# Patient Record
Sex: MALE | Race: OTHER | Employment: FULL TIME | URBAN - METROPOLITAN AREA
[De-identification: names, ages, dates, MRNs, and addresses within clinical notes are randomized per-mention and may not be internally consistent; named-entity substitution may affect disease eponyms.]

---

## 2021-01-22 ENCOUNTER — HOSPITAL ENCOUNTER (OUTPATIENT)
Dept: WOUND CARE | Age: 46
Discharge: HOME | End: 2021-01-22
Attending: SURGERY

## 2021-01-22 VITALS
HEIGHT: 68 IN | HEART RATE: 62 BPM | SYSTOLIC BLOOD PRESSURE: 129 MMHG | DIASTOLIC BLOOD PRESSURE: 75 MMHG | WEIGHT: 147 LBS | TEMPERATURE: 97.6 F | BODY MASS INDEX: 22.28 KG/M2

## 2021-01-22 PROCEDURE — 99213 OFFICE O/P EST LOW 20 MIN: CPT

## 2021-01-22 RX ORDER — ELECTROLYTES/DEXTROSE
SOLUTION, ORAL ORAL ONCE
Status: DISPENSED | OUTPATIENT
Start: 2021-01-22 | End: 2021-01-22

## 2021-01-22 RX ORDER — TRIAMCINOLONE ACETONIDE 5 MG/G
CREAM TOPICAL 2 TIMES DAILY
Qty: 15 G | Refills: 0 | Status: SHIPPED | OUTPATIENT
Start: 2021-01-22

## 2021-01-22 NOTE — DISCHARGE INSTRUCTIONS
Right lower leg:  Wash leg gently with soap and water. Apply steroid cream to rash. Cover with xeroform gauze and cover with dry gauze. Wear tubigrip sock each day.   Change dressing every day.  Leanne Dickerson Dr [facility]  [phone]        Friday, January 29th at 8:00am

## 2021-01-22 NOTE — WOUND CARE
[address] [place] [address], [address]  Phone: [phone] Fax: [phone] Patient: Daniel Gorman MRN: [id number]  SSN: [id number] YOB: 1975  Age: 39 y.o. Sex: male Return Appointment: 1 week with Ela Penny MD 
 
Instructions: 
 
 Right lower leg: 
Wash leg gently with soap and water. Apply steroid cream to rash. Cover with xeroform gauze and cover with dry gauze. Wear tubigrip sock each day. Change dressing every day. [address] [facility], [address] [facility] 
[phone] Friday, January 29th at 8:00am 
 
 
Should you experience increased redness, swelling, pain, foul odor, size of wound(s), or have a temperature over 101 degrees please contact the [address] [place] [address] Road at [phone] or if after hours contact your primary care physician or go to the hospital emergency department. Signed By: Rebecca Streeter RN   
 January 22, 2021

## 2021-01-22 NOTE — WOUND CARE
[facility] de curación de heridas [address], [address]  Teléfono: [phone] Fax: [phone] Paciente: Cherelle Grullon MRN: [id number] SSN: [id number] Fecha de nacimiento: 1975 Edad: 45 años Sexo masculino Caitlin de regreso: 1 semana con Hansa Rosario MD 
 
 
 Instrucciones: Jewel [facility]: Lave la pierna suavemente con agua y [place]. Aplique crema con esteroides sobre la erupción. Cubrir con gasa xeroform y [facility] con gasa seca. Use calcetines tubigrip todos los terrell. Cambie el vendaje todos los días. [facility] de atención primaria de [address] [facility] [address] [facility], [address] [facility]  
[phone] Viernes 29 de enero a las 8:00 am  
 
 
Si experimenta un aumento de enrojecimiento, hinchazón, dolor, mal olor, tamaño de la (s) herida (s) o tiene goldie temperatura superior a 101 grados, comuníquese con [facility] de [facility] al [phone] o si fuera del horario de [facility], comuníquese con todd médico de atención primaria o vaya al servicio de urgencias del hospital.  
 
Damaso Stands por: Donny Duque RN 22 de enero de 2021

## 2021-01-22 NOTE — WOUND CARE
01/22/21 2063 Wound Ankle Right;Medial #1 Date First Assessed/Time First Assessed: 01/22/21 0833   Present on Hospital Admission: Yes  Primary Wound Type: Rash  Location: Ankle  Wound Location Orientation: Right;Medial  Wound Description: #1 Wound Image Wound Etiology Other (Comment) 
(rash) Dressing/Treatment Open to air Wound Length (cm) 9 cm Wound Width (cm) 4.5 cm Wound Depth (cm) 0.1 cm Wound Surface Area (cm^2) 40.5 cm^2 Wound Volume (cm^3) 4.05 cm^3 Wound Assessment  
(rash) Drainage Amount Scant Drainage Description Serous Wound Odor None Wound Leg lower Right;Medial #2 Date First Assessed/Time First Assessed: 01/22/21 0834   Present on Hospital Admission: Yes  Primary Wound Type: Rash  Location: Leg lower  Wound Location Orientation: Right;Medial  Wound Description: #2 Wound Image Wound Etiology Other (Comment) 
(rash) Cleansed Cleansed with saline Dressing/Treatment Open to air Wound Length (cm) 4 cm Wound Width (cm) 2.5 cm Wound Depth (cm) 0.1 cm Wound Surface Area (cm^2) 10 cm^2 Wound Volume (cm^3) 1 cm^3 Wound Assessment  
(rash) Drainage Amount Scant Drainage Description Serous Wound Odor None

## 2021-02-05 NOTE — PROGRESS NOTES
Wound Center Progress Note    Patient: Fitz Awad MRN: [id number]  SSN: [id number]    YOB: 1975  Age: 39 y.o. 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 No pertinent surgical history. History reviewed. No pertinent family history. Social History     Tobacco Use    Smoking status: Current Every Day Smoker     Packs/day: 1.00    Smokeless tobacco: Never Used   Substance Use Topics    Alcohol use: No     Comment: quit 2 mos       Prior to Admission medications    Medication Sig Start Date End Date Taking? Authorizing Provider   triamcinolone (ARISTOCORT) 0.5 % topical cream Apply  to affected area two (2) times a day. use thin layer 1/22/21  Yes Lester Ramirez MD     Allergies   Allergen Reactions    Pcn [Penicillins] Hives        Review of Systems:  CONSTITUTIONAL: No fever, chills  HEAD: No headache  EYES: No visual loss  ENT: No hearing loss  SKIN: + rash  CARDIOVASCULAR: No chest pain  RESPIRATORY: No shortness of breath  GASTROINTESTINAL: No nausea, vomiting  GENITOURINARY: No excessive urination  NEUROLOGICAL: No weakness  MUSCULOSKELETAL: No muscle pain. No neck pain  HEMATOLOGIC: No easy bleeding  LYMPHATICS: No lymphedema. PSYCHIATRIC: No current depression  ENDOCRINOLOGIC: No high sugars  ALLERGIES: No history of asthma, hives, eczema or rhinitis. No results found for: HBA1C, CDU7SNGJ, HGBE8, ALU4QWTB, LUZ7LQEA     Immunization History   Administered Date(s) Administered    TD Vaccine 10/01/2012       Body mass index is 22.35 kg/m².       Current medications:  Current Outpatient Medications Medication Sig Dispense Refill    triamcinolone (ARISTOCORT) 0.5 % topical cream Apply  to affected area two (2) times a day. use thin layer 15 g 0         Objective:     Physical Exam:     Visit Vitals  /75 (BP 1 Location: Left arm, BP Patient Position: At rest)   Pulse 62   Temp 97.6 °F (36.4 °C)   Ht 5' 8\" (1.727 m)   Wt 66.7 kg (147 lb)   BMI 22.35 kg/m²       General: well developed, well nourished  Psych: cooperative. Pleasant  Neuro: alert and oriented to person/place/situation. Otherwise nonfocal.  Derm: Normal turgor for age, dry skin  HEENT: Normocephalic, atraumatic. EOMI. Neck: Normal range of motion.   Chest: Respirations nonlabored  Cardio: RRR  Abdomen: Soft, nondistended  Lower extremities:   No hemosiderrosis  No significant varicosities  Capillary refill <3 sec    Right  2+ DP/PT    Left  2+ DP/PT                Ulcer Description:   Wound Ankle Right;Medial #1 (Active)   Wound Image   01/22/21 0834   Wound Etiology Other (Comment) 01/22/21 0834   Dressing/Treatment Open to air 01/22/21 0834   Wound Length (cm) 9 cm 01/22/21 0834   Wound Width (cm) 4.5 cm 01/22/21 0834   Wound Depth (cm) 0.1 cm 01/22/21 0834   Wound Surface Area (cm^2) 40.5 cm^2 01/22/21 0834   Wound Volume (cm^3) 4.05 cm^3 01/22/21 0834   Drainage Amount Scant 01/22/21 0834   Drainage Description Serous 01/22/21 0834   Wound Odor None 01/22/21 0834   Number of days: 14       Wound Leg lower Right;Medial #2 (Active)   Wound Image   01/22/21 0834   Wound Etiology Other (Comment) 01/22/21 0834   Cleansed Cleansed with saline 01/22/21 0834   Dressing/Treatment Open to air 01/22/21 0834   Wound Length (cm) 4 cm 01/22/21 0834   Wound Width (cm) 2.5 cm 01/22/21 0834   Wound Depth (cm) 0.1 cm 01/22/21 0834   Wound Surface Area (cm^2) 10 cm^2 01/22/21 0834   Wound Volume (cm^3) 1 cm^3 01/22/21 0834   Drainage Amount Scant 01/22/21 0834   Drainage Description Serous 01/22/21 0834   Wound Odor None 01/22/21 0834   Number of days: 14 Assessment/Plan:     Dermatitis of unclear etiology. No active infection or wound. Start steroid cream. Contact for lack of improvement.      Signed By: Abril Benitez MD